# Patient Record
Sex: FEMALE | Race: WHITE | Employment: OTHER | URBAN - METROPOLITAN AREA
[De-identification: names, ages, dates, MRNs, and addresses within clinical notes are randomized per-mention and may not be internally consistent; named-entity substitution may affect disease eponyms.]

---

## 2018-11-19 ENCOUNTER — HOSPITAL ENCOUNTER (EMERGENCY)
Age: 83
Discharge: HOME | End: 2018-11-19
Attending: EMERGENCY MEDICINE
Payer: MEDICARE

## 2018-11-19 ENCOUNTER — APPOINTMENT (OUTPATIENT)
Dept: CT IMAGING | Age: 83
End: 2018-11-19
Payer: MEDICARE

## 2018-11-19 ENCOUNTER — APPOINTMENT (OUTPATIENT)
Dept: GENERAL RADIOLOGY | Age: 83
End: 2018-11-19
Payer: MEDICARE

## 2018-11-19 VITALS
DIASTOLIC BLOOD PRESSURE: 86 MMHG | WEIGHT: 154.76 LBS | TEMPERATURE: 97.9 F | SYSTOLIC BLOOD PRESSURE: 136 MMHG | HEART RATE: 72 BPM | HEIGHT: 61 IN | BODY MASS INDEX: 29.22 KG/M2 | RESPIRATION RATE: 20 BRPM | OXYGEN SATURATION: 95 %

## 2018-11-19 DIAGNOSIS — N30.00 ACUTE CYSTITIS WITHOUT HEMATURIA: Primary | ICD-10-CM

## 2018-11-19 DIAGNOSIS — N17.9 AKI (ACUTE KIDNEY INJURY) (HCC): ICD-10-CM

## 2018-11-19 DIAGNOSIS — D72.829 LEUKOCYTOSIS, UNSPECIFIED TYPE: ICD-10-CM

## 2018-11-19 DIAGNOSIS — E16.2 HYPOGLYCEMIA: ICD-10-CM

## 2018-11-19 LAB
ANION GAP SERPL CALCULATED.3IONS-SCNC: 12 MMOL/L (ref 3–16)
BACTERIA: ABNORMAL /HPF
BASOPHILS ABSOLUTE: 0.1 K/UL (ref 0–0.2)
BASOPHILS RELATIVE PERCENT: 0.4 %
BILIRUBIN URINE: NEGATIVE
BLOOD, URINE: NEGATIVE
BUN BLDV-MCNC: 39 MG/DL (ref 7–20)
CALCIUM SERPL-MCNC: 9.8 MG/DL (ref 8.3–10.6)
CHLORIDE BLD-SCNC: 99 MMOL/L (ref 99–110)
CLARITY: ABNORMAL
CO2: 29 MMOL/L (ref 21–32)
COLOR: YELLOW
CREAT SERPL-MCNC: 1.6 MG/DL (ref 0.6–1.2)
EOSINOPHILS ABSOLUTE: 0.2 K/UL (ref 0–0.6)
EOSINOPHILS RELATIVE PERCENT: 0.9 %
EPITHELIAL CELLS, UA: 1 /HPF (ref 0–5)
GFR AFRICAN AMERICAN: 37
GFR NON-AFRICAN AMERICAN: 31
GLUCOSE BLD-MCNC: 133 MG/DL (ref 70–99)
GLUCOSE BLD-MCNC: 152 MG/DL (ref 70–99)
GLUCOSE BLD-MCNC: 159 MG/DL (ref 70–99)
GLUCOSE URINE: NEGATIVE MG/DL
HCT VFR BLD CALC: 35.2 % (ref 36–48)
HEMOGLOBIN: 11.6 G/DL (ref 12–16)
HYALINE CASTS: 1 /LPF (ref 0–8)
KETONES, URINE: NEGATIVE MG/DL
LEUKOCYTE ESTERASE, URINE: ABNORMAL
LYMPHOCYTES ABSOLUTE: 1 K/UL (ref 1–5.1)
LYMPHOCYTES RELATIVE PERCENT: 5.1 %
MCH RBC QN AUTO: 30.9 PG (ref 26–34)
MCHC RBC AUTO-ENTMCNC: 33.1 G/DL (ref 31–36)
MCV RBC AUTO: 93.4 FL (ref 80–100)
MICROSCOPIC EXAMINATION: YES
MONOCYTES ABSOLUTE: 1.1 K/UL (ref 0–1.3)
MONOCYTES RELATIVE PERCENT: 6.2 %
NEUTROPHILS ABSOLUTE: 16.2 K/UL (ref 1.7–7.7)
NEUTROPHILS RELATIVE PERCENT: 87.4 %
NITRITE, URINE: NEGATIVE
PDW BLD-RTO: 13.9 % (ref 12.4–15.4)
PERFORMED ON: ABNORMAL
PERFORMED ON: ABNORMAL
PH UA: 5
PLATELET # BLD: 211 K/UL (ref 135–450)
PMV BLD AUTO: 8.5 FL (ref 5–10.5)
POTASSIUM REFLEX MAGNESIUM: 4.4 MMOL/L (ref 3.5–5.1)
PROTEIN UA: NEGATIVE MG/DL
RBC # BLD: 3.77 M/UL (ref 4–5.2)
RBC UA: 1 /HPF (ref 0–4)
SODIUM BLD-SCNC: 140 MMOL/L (ref 136–145)
SPECIFIC GRAVITY UA: 1.01
URINE REFLEX TO CULTURE: YES
URINE TYPE: ABNORMAL
UROBILINOGEN, URINE: 0.2 E.U./DL
WBC # BLD: 18.6 K/UL (ref 4–11)
WBC UA: 12 /HPF (ref 0–5)

## 2018-11-19 PROCEDURE — 96361 HYDRATE IV INFUSION ADD-ON: CPT

## 2018-11-19 PROCEDURE — 70450 CT HEAD/BRAIN W/O DYE: CPT

## 2018-11-19 PROCEDURE — 87086 URINE CULTURE/COLONY COUNT: CPT

## 2018-11-19 PROCEDURE — 87186 SC STD MICRODIL/AGAR DIL: CPT

## 2018-11-19 PROCEDURE — 4500000025 HC ED LEVEL 5 PROCEDURE

## 2018-11-19 PROCEDURE — 96360 HYDRATION IV INFUSION INIT: CPT

## 2018-11-19 PROCEDURE — 73522 X-RAY EXAM HIPS BI 3-4 VIEWS: CPT

## 2018-11-19 PROCEDURE — 2580000003 HC RX 258: Performed by: EMERGENCY MEDICINE

## 2018-11-19 PROCEDURE — 85025 COMPLETE CBC W/AUTO DIFF WBC: CPT

## 2018-11-19 PROCEDURE — 99285 EMERGENCY DEPT VISIT HI MDM: CPT

## 2018-11-19 PROCEDURE — 87077 CULTURE AEROBIC IDENTIFY: CPT

## 2018-11-19 PROCEDURE — 72125 CT NECK SPINE W/O DYE: CPT

## 2018-11-19 PROCEDURE — 73110 X-RAY EXAM OF WRIST: CPT

## 2018-11-19 PROCEDURE — 80048 BASIC METABOLIC PNL TOTAL CA: CPT

## 2018-11-19 PROCEDURE — 6370000000 HC RX 637 (ALT 250 FOR IP): Performed by: EMERGENCY MEDICINE

## 2018-11-19 PROCEDURE — 73080 X-RAY EXAM OF ELBOW: CPT

## 2018-11-19 PROCEDURE — 81001 URINALYSIS AUTO W/SCOPE: CPT

## 2018-11-19 PROCEDURE — 93005 ELECTROCARDIOGRAM TRACING: CPT | Performed by: EMERGENCY MEDICINE

## 2018-11-19 RX ORDER — CEPHALEXIN 500 MG/1
500 CAPSULE ORAL ONCE
Status: COMPLETED | OUTPATIENT
Start: 2018-11-19 | End: 2018-11-19

## 2018-11-19 RX ORDER — 0.9 % SODIUM CHLORIDE 0.9 %
500 INTRAVENOUS SOLUTION INTRAVENOUS ONCE
Status: COMPLETED | OUTPATIENT
Start: 2018-11-19 | End: 2018-11-19

## 2018-11-19 RX ORDER — CEPHALEXIN 500 MG/1
500 CAPSULE ORAL 2 TIMES DAILY
Qty: 14 CAPSULE | Refills: 0 | Status: SHIPPED | OUTPATIENT
Start: 2018-11-19 | End: 2018-11-26

## 2018-11-19 RX ADMIN — CEPHALEXIN 500 MG: 500 CAPSULE ORAL at 22:08

## 2018-11-19 RX ADMIN — SODIUM CHLORIDE 500 ML: 9 INJECTION, SOLUTION INTRAVENOUS at 17:15

## 2018-11-19 ASSESSMENT — ENCOUNTER SYMPTOMS
CHEST TIGHTNESS: 0
DIARRHEA: 0
NAUSEA: 0
ABDOMINAL PAIN: 0
VOMITING: 0
SHORTNESS OF BREATH: 0

## 2018-11-19 NOTE — ED PROVIDER NOTES
[facility]  eMERGENCY dEPARTMENT eNCOUnter         Pt Name: Nabila Jordan  MRN: [id number]  [place] 1934  Date of evaluation: 11/19/2018  Provider: Ester Nieto MD  PCP: Frank Porter MD      [address] [place] [address]       Chief Complaint   Patient presents with    Hypoglycemia     pt blood sugar was 60 at the nursing home       HISTORY OFPRESENT ILLNESS   (Location/Symptom, Timing/Onset, Context/Setting, Quality, Duration, Modifying Factors,Severity)  Note limiting factors. Nabila Jordan is a 80 y.o. female presents from nursing home where she stays for history of dementia after having hypoglycemia and a fall. Patient does not remember falling. Patient fell 3 days ago and has known right wrist fracture. Patient was found to have blood sugar in the 60s per nursing home staff and was given glucagon. EMS arrived and found patient's blood sugar to be in the 70s and gave her 25 of dextrose. On arrival, patient does complain of some hip pain, right wrist pain and does not remember the incident. Denies any chest pain, shortness of breath, abdominal pain. Patient does take Xarelto on chart review. Patient has history of dementia and therefore history unreliable. Son at bedside states this is patient's normal baseline mentation. Nursing Notes were all reviewed and agreed with or any disagreements were addressed  in the HPI. REVIEW OF SYSTEMS    (2-9 systems for level 4, 10 or more for level 5)     Review of Systems   Respiratory: Negative for chest tightness and shortness of breath. Cardiovascular: Negative for chest pain and leg swelling. Gastrointestinal: Negative for abdominal pain, diarrhea, nausea and vomiting. Genitourinary: Negative for dysuria and urgency. Musculoskeletal: Positive for arthralgias. Negative for neck pain. Skin: Positive for wound. Negative for rash. Neurological: Negative for facial asymmetry, weakness and numbness. distal ulna, carpal bones and metacarpal bones   appear intact. 3. Osteoporosis. 4. Diffuse soft tissue edema. 5.  Vascular calcifications are noted reflecting calcific atherosclerosis. 6. Osteoarthritis. 7. Overlying splint material obscures detail. PROCEDURES   Unless otherwise noted below, none     Splint Application  Date/Time: 11/19/2018 9:31 PM  Performed by: Rich Plaza  Authorized by: Rich Plaza     Consent:     Consent obtained:  Verbal    Consent given by:  Maddison Galvin (son)    Risks discussed:  Pain and numbness    Alternatives discussed:  No treatment and observation  Pre-procedure details:     Sensation:  Normal    Skin color:  Normal  Procedure details:     Laterality:  Right    Location:  Wrist    Wrist:  R wrist    Strapping: no      Splint type:  Volar short arm    Supplies:  Elastic bandage, cotton padding and Ortho-Glass  Post-procedure details:     Pain:  Unchanged    Sensation:  Normal    Skin color:  Normal    Patient tolerance of procedure: Tolerated well, no immediate complications        CRITICAL CARE TIME   Total Critical Care time was 0 minutes, excluding separately reportable procedures. There was a high probability of clinically significant/life threatening deterioration in the patient's condition which required my urgent intervention.       CONSULTS:  None    EMERGENCY DEPARTMENT COURSE and DIFFERENTIAL DIAGNOSIS/MDM:   Vitals:    Vitals:    11/19/18 1714 11/19/18 1900 11/19/18 2130   BP: (!) 161/57 (!) 141/91    Pulse: 63 74    Resp: 18 21    Temp:   97.9 °F (36.6 °C)   TempSrc:   Oral   SpO2: 97% 99%    Weight: 154 lb 12.2 oz (70.2 kg)     Height: 5' 1\" (1.549 m)         Patient was given the following medications:  Medications   cephALEXin (KEFLEX) capsule 500 mg (not administered)   0.9 % sodium chloride bolus (500 mLs Intravenous New Bag 11/19/18 1715)       MDM  Hypoglycemia, poor oral intake, inappropriate medication, dehydration, UTI, arrhythmia,

## 2018-11-21 LAB
EKG ATRIAL RATE: 65 BPM
EKG DIAGNOSIS: NORMAL
EKG P AXIS: 51 DEGREES
EKG P-R INTERVAL: 132 MS
EKG Q-T INTERVAL: 526 MS
EKG QRS DURATION: 160 MS
EKG QTC CALCULATION (BAZETT): 547 MS
EKG R AXIS: 240 DEGREES
EKG T AXIS: 35 DEGREES
EKG VENTRICULAR RATE: 65 BPM
ORGANISM: ABNORMAL
URINE CULTURE, ROUTINE: ABNORMAL
URINE CULTURE, ROUTINE: ABNORMAL

## 2018-11-21 PROCEDURE — 93010 ELECTROCARDIOGRAM REPORT: CPT | Performed by: INTERNAL MEDICINE

## 2018-11-21 NOTE — PROGRESS NOTES
Patient's positive result has been appropriately evaluated by the provider pool. Patient is at [facility], thery were contacted and notified of the change in treatment plan.     New order given to Cassandra Cantrell, nurse at [facility] @ [address] [facility],[address]:   [address] [place] [id number]  Phone: [phone] Fax: [phone]    Phone number:   Pharmacist receiving the prescription: